# Patient Record
Sex: MALE | Race: WHITE | NOT HISPANIC OR LATINO
[De-identification: names, ages, dates, MRNs, and addresses within clinical notes are randomized per-mention and may not be internally consistent; named-entity substitution may affect disease eponyms.]

---

## 2024-07-27 ENCOUNTER — NON-APPOINTMENT (OUTPATIENT)
Age: 24
End: 2024-07-27

## 2024-07-29 ENCOUNTER — NON-APPOINTMENT (OUTPATIENT)
Age: 24
End: 2024-07-29

## 2024-07-30 ENCOUNTER — APPOINTMENT (OUTPATIENT)
Dept: ORTHOPEDIC SURGERY | Facility: CLINIC | Age: 24
End: 2024-07-30
Payer: COMMERCIAL

## 2024-07-30 ENCOUNTER — NON-APPOINTMENT (OUTPATIENT)
Age: 24
End: 2024-07-30

## 2024-07-30 VITALS — BODY MASS INDEX: 18.9 KG/M2 | WEIGHT: 135 LBS | HEIGHT: 71 IN

## 2024-07-30 DIAGNOSIS — S62.509A FRACTURE OF UNSPECIFIED PHALANX OF UNSPECIFIED THUMB, INITIAL ENCOUNTER FOR CLOSED FRACTURE: ICD-10-CM

## 2024-07-30 PROBLEM — Z00.00 ENCOUNTER FOR PREVENTIVE HEALTH EXAMINATION: Status: ACTIVE | Noted: 2024-07-30

## 2024-07-30 PROCEDURE — 29075 APPL CST ELBW FNGR SHORT ARM: CPT | Mod: LT

## 2024-07-30 PROCEDURE — 99203 OFFICE O/P NEW LOW 30 MIN: CPT | Mod: 25

## 2024-07-30 PROCEDURE — 73130 X-RAY EXAM OF HAND: CPT | Mod: LT

## 2024-07-30 NOTE — END OF VISIT
[FreeTextEntry4] :  I, SADAF MCFARLANE wrote this note acting as a scribe for Dr. Tereso Quezada on Jul 30, 2024.

## 2024-07-30 NOTE — PHYSICAL EXAM
[de-identified] :  Patient is WDWN, alert, and in no acute distress. Breathing is unlabored. He is grossly oriented to person, place, and time.  Inspection/Palpation: Mild swelling and tenderness over the base of thumb . No visible deformities or skin lesions. ecchymosis and edema noted. Range of Motion: Limited due to pain. Strength: Reduced due to pain. Sensation: Intact to light touch.    [de-identified] : AP, lateral, and oblique views of the left thumb were obtained today and revealed an extraarticular metacarpal fracture at the base of the thumb.

## 2024-07-30 NOTE — HISTORY OF PRESENT ILLNESS
[de-identified] : Pt is a 22 y/o RHD male with left thumb fracture.  [place].  He felt pain immediately.  He went to [facility] where xrays revealed a left thumb fracture.  A splint was applied and he was advised to follow up with a specialist.

## 2024-07-30 NOTE — DISCUSSION/SUMMARY
[de-identified] : The underlying pathophysiology was reviewed with the patient. XR films were reviewed with the patient. Discussed at length the nature of the patient's condition. The left thumb fracture is confirmed and appears stable. Surgical intervention is not necessary at this time.  The patient was advised  to take OTC medications for pain management. A short arm thumb spica cast was applied .. The importance of keeping the thumb immobilized to promote healing was emphasized. Further treatment options, including the possibility of physical therapy once the fracture has healed, were discussed.   All questions answered, understanding verbalized. Patient in agreement with the plan of care. Follow-up is scheduled in 4 weeks or as needed.

## 2024-08-26 ENCOUNTER — APPOINTMENT (OUTPATIENT)
Dept: ORTHOPEDIC SURGERY | Facility: CLINIC | Age: 24
End: 2024-08-26

## 2024-08-26 DIAGNOSIS — S62.509A FRACTURE OF UNSPECIFIED PHALANX OF UNSPECIFIED THUMB, INITIAL ENCOUNTER FOR CLOSED FRACTURE: ICD-10-CM

## 2024-08-26 PROCEDURE — 73140 X-RAY EXAM OF FINGER(S): CPT

## 2024-08-26 PROCEDURE — 29705 RMVL/BIVLV FULL ARM/LEG CAST: CPT | Mod: LT

## 2024-08-26 NOTE — ADDENDUM
[FreeTextEntry1] : I, Liban Smiley Jr, acted solely as a scribe for Dr. Tereso Quezada on this date 08/26/2024  All medical record entries made by the Scribe were at my, Dr. Tereso Quezada, direction and personally dictated by me on 08/26/2024 . I have reviewed the chart and agree that the record accurately reflects my personal performance of the history, physical exam, assessment and plan. I have also personally directed, reviewed, and agreed with the chart.

## 2024-08-26 NOTE — PHYSICAL EXAM
[de-identified] :  Patient is WDWN, alert, and in no acute distress. Breathing is unlabored. He is grossly oriented to person, place, and time.  Inspection/Palpation: Mild swelling and tenderness over the base of thumb . No visible deformities or skin lesions. ecchymosis and edema noted. Range of Motion: Limited due to pain. Strength: Reduced due to pain. Sensation: Intact to light touch.    [de-identified] : AP, lateral, and oblique views of the left thumb were obtained today and revealed an extraarticular metacarpal fracture at the base of the thumb.

## 2024-08-26 NOTE — HISTORY OF PRESENT ILLNESS
[de-identified] : Pt is a 22 y/o RHD male with left thumb fracture.  [place] 2 [place].  He felt pain immediately.  He went to [facility] where xrays revealed a left thumb fracture.  A splint was applied and he was advised to follow up with a specialist.  Today, Aug 26, 2024, the patient presents for follow-up and further management. The patient continues to find improvement following his left thumb fracture. He states that he has no pain present today. He presents today in a cast.

## 2024-08-26 NOTE — DISCUSSION/SUMMARY
[de-identified] : The underlying pathophysiology was reviewed with the patient. XR films were reviewed with the patient. Discussed at length the nature of the patient's condition. The patient appears to be doing well at this time.  The left short arm cast was removed today. Activity modifications were reviewed with the patient at length. Patient was advised to continue with observation of his symptoms.  Gentle range of motion and strengthening exercises were encouraged, as tolerated by pain.  All questions answered, understanding verbalized. Patient in agreement with the plan of care.   If the patient begins to experience any changes or severe exacerbation of his symptoms, he should reach out to me as soon as possible. Otherwise, he should return to me as needed.